# Patient Record
Sex: MALE | Race: ASIAN | Employment: STUDENT | URBAN - METROPOLITAN AREA
[De-identification: names, ages, dates, MRNs, and addresses within clinical notes are randomized per-mention and may not be internally consistent; named-entity substitution may affect disease eponyms.]

---

## 2023-02-09 ENCOUNTER — HOSPITAL ENCOUNTER (OUTPATIENT)
Age: 28
Discharge: HOME | End: 2023-02-09
Payer: COMMERCIAL

## 2023-02-09 VITALS
DIASTOLIC BLOOD PRESSURE: 75 MMHG | HEIGHT: 66 IN | WEIGHT: 180.75 LBS | HEART RATE: 110 BPM | BODY MASS INDEX: 29.05 KG/M2 | TEMPERATURE: 98 F | OXYGEN SATURATION: 100 % | RESPIRATION RATE: 18 BRPM | SYSTOLIC BLOOD PRESSURE: 146 MMHG

## 2023-02-09 DIAGNOSIS — R21 RASH: Primary | ICD-10-CM

## 2023-02-09 DIAGNOSIS — B02.9 HERPES ZOSTER WITHOUT COMPLICATION: ICD-10-CM

## 2023-02-09 PROCEDURE — 99203 OFFICE O/P NEW LOW 30 MIN: CPT

## 2023-02-09 RX ORDER — VALACYCLOVIR HYDROCHLORIDE 1 G/1
1000 TABLET, FILM COATED ORAL 3 TIMES DAILY
Qty: 21 TABLET | Refills: 0 | Status: SHIPPED | OUTPATIENT
Start: 2023-02-09 | End: 2023-02-16

## 2023-02-09 RX ORDER — PREDNISONE 20 MG/1
40 TABLET ORAL DAILY
Qty: 10 TABLET | Refills: 0 | Status: SHIPPED | OUTPATIENT
Start: 2023-02-09 | End: 2023-02-14

## 2023-02-09 NOTE — DISCHARGE INSTRUCTIONS
Take medications as directed. You may also take an antihistamine which you will purchase over-the-counter such as Claritin, Allegra, Zyrtec, or Xyzal.  You may also take Benadryl at bedtime 25 to 50 mg to help with the itching. Follow-up with primary care doctor which was provided.

## 2023-02-09 NOTE — ED INITIAL ASSESSMENT (HPI)
Patient states he has a rash to the right chest/shoulder x 3-4 days and no where else. States the rash has increased in size and has tried benadryl x 1 and hydrocortisone ointment without any relief. States he has started a new lotion recently and eaten Nutella for the first time. Denies any SOB/CELSO, difficulty swallowing, or other symptoms.

## 2024-04-29 ENCOUNTER — HOSPITAL ENCOUNTER (OUTPATIENT)
Age: 29
Discharge: HOME | End: 2024-04-29
Attending: STUDENT IN AN ORGANIZED HEALTH CARE EDUCATION/TRAINING PROGRAM
Payer: COMMERCIAL

## 2024-04-29 VITALS
TEMPERATURE: 98 F | RESPIRATION RATE: 18 BRPM | DIASTOLIC BLOOD PRESSURE: 76 MMHG | HEART RATE: 103 BPM | WEIGHT: 176.38 LBS | HEIGHT: 65 IN | BODY MASS INDEX: 29.38 KG/M2 | OXYGEN SATURATION: 99 % | SYSTOLIC BLOOD PRESSURE: 145 MMHG

## 2024-04-29 DIAGNOSIS — L85.8 KERATOSIS PILARIS: Primary | ICD-10-CM

## 2024-04-29 PROCEDURE — 99212 OFFICE O/P EST SF 10 MIN: CPT

## 2024-04-29 PROCEDURE — 99213 OFFICE O/P EST LOW 20 MIN: CPT

## 2024-04-29 NOTE — DISCHARGE INSTRUCTIONS
Rash is consistent with keratosis pilaris which is a type of atopic dermatitis.  While showering you can use a very small amount of salicylic acid and afterwards moisturize well.

## 2024-04-29 NOTE — ED PROVIDER NOTES
History   No chief complaint on file.      HPI    28 year old male presents with a few weeks of rash that is noted along his bilateral upper arms, shoulders and back.  Occasionally pruritic especially when he sweats.  Denies any prior history of eczema or psoriasis.  No new medicines soaps or lotions.  Reports uses bar soap in the shower and sometimes applies Vaseline on himself afterwards.  Or symptoms  No other associated fevers        No past medical history on file.    No past surgical history on file.    No pertinent social history.                 Physical Exam     ED Triage Vitals [04/29/24 1246]   /76   Pulse 103   Resp 18   Temp 97.7 °F (36.5 °C)   Temp src Temporal   SpO2 99 %   O2 Device None (Room air)       Physical Exam  Constitutional:       General: He is not in acute distress.  Skin:     Comments: Multiple erythematous keratotic follicular papules along upper extremities and back.  No purulence or induration.   Neurological:      Mental Status: He is alert.              ED Course     Labs Reviewed - No data to display  No results found.        MDM     Vitals:    04/29/24 1246   BP: 145/76   Pulse: 103   Resp: 18   Temp: 97.7 °F (36.5 °C)   TempSrc: Temporal   SpO2: 99%   Weight: 80 kg   Height: 165.1 cm (5' 5\")       His rash is most consistent with  keratosis pilaris.  Mild eczematous changes more on the back.  Advised salicylic acid in the shower followed by moisturizers.  Dermatology referral given if symptoms or not improving.           Disposition and Plan     Clinical Impression:  1. Keratosis pilaris        Disposition:  Discharge    Follow-up:  Tiago Lopez MD  [address] [address] [phone]      As needed, If symptoms worsen      Medications Prescribed:  There are no discharge medications for this patient.

## (undated) NOTE — LETTER
Date & Time: 4/29/2024, 1:04 PM  Patient: Sajan Tavares  Encounter Provider(s):    Pierre Miller MD       To Whom It May Concern:    Sajan Tavares was seen and treated in our department on 4/29/2024. He may return to school on 4/30/24.    If you have any questions or concerns, please do not hesitate to call.        _____________________________  Physician/[facility] Signature